# Patient Record
Sex: FEMALE | Race: WHITE | NOT HISPANIC OR LATINO | Employment: STUDENT | URBAN - METROPOLITAN AREA
[De-identification: names, ages, dates, MRNs, and addresses within clinical notes are randomized per-mention and may not be internally consistent; named-entity substitution may affect disease eponyms.]

---

## 2020-05-22 ENCOUNTER — OFFICE VISIT (OUTPATIENT)
Dept: GASTROENTEROLOGY | Facility: CLINIC | Age: 20
End: 2020-05-22

## 2020-05-22 VITALS — BODY MASS INDEX: 22.18 KG/M2 | HEIGHT: 66 IN | TEMPERATURE: 98 F | WEIGHT: 138 LBS

## 2020-05-22 DIAGNOSIS — K21.9 GASTROESOPHAGEAL REFLUX DISEASE, ESOPHAGITIS PRESENCE NOT SPECIFIED: ICD-10-CM

## 2020-05-22 DIAGNOSIS — R10.13 EPIGASTRIC PAIN: Primary | ICD-10-CM

## 2020-05-22 PROCEDURE — 99203 OFFICE O/P NEW LOW 30 MIN: CPT | Performed by: INTERNAL MEDICINE

## 2020-05-22 RX ORDER — LORATADINE 10 MG/1
1 CAPSULE, LIQUID FILLED ORAL DAILY
COMMUNITY
End: 2020-08-05 | Stop reason: ALTCHOICE

## 2020-05-22 RX ORDER — RABEPRAZOLE SODIUM 20 MG/1
20 TABLET, DELAYED RELEASE ORAL EVERY MORNING
Qty: 30 TABLET | Refills: 3 | Status: SHIPPED | OUTPATIENT
Start: 2020-05-22 | End: 2020-07-05 | Stop reason: SDUPTHER

## 2020-05-22 RX ORDER — RABEPRAZOLE SODIUM 20 MG/1
20 TABLET, DELAYED RELEASE ORAL EVERY MORNING
COMMUNITY
Start: 2020-04-29 | End: 2020-05-22 | Stop reason: SDUPTHER

## 2020-05-22 NOTE — PROGRESS NOTES
Subjective   Chief Complaint   Patient presents with   • Heartburn   • belching   • Abdominal Pain     discomfort       Aurea Rush is a  19 y.o. female here for an initial patient visit for heartburn, belching and abdominal pain.    Pt reports longstanding acid reflux for years.  She took TUMS prn.  She had symptoms after she ate regardless of food choices. She describes the symptoms as burning chest pain - she also had dull abdominal pain at times.  She felt regurgitation when she lies down.  She burps frequently and regurgitates.  The current med she takes is helping - she only has breakthrough if she eats something spicy.  No trouble swallowing.  Weight stable - no blood in stool.  No NSAIDs.          Past Medical History:   Diagnosis Date   • GERD (gastroesophageal reflux disease)      History reviewed. No pertinent surgical history.    Current Outpatient Medications:   •  Loratadine (Claritin) 10 MG capsule, Take 1 tablet by mouth Daily., Disp: , Rfl:   •  Multiple Vitamins-Minerals (HAIR SKIN AND NAILS FORMULA PO), Take 1 tablet by mouth Daily., Disp: , Rfl:   •  RABEprazole (ACIPHEX) 20 MG EC tablet, Take 1 tablet by mouth Every Morning., Disp: 30 tablet, Rfl: 3  PRN Meds:.  No Known Allergies  Social History     Socioeconomic History   • Marital status: Single     Spouse name: Not on file   • Number of children: Not on file   • Years of education: Not on file   • Highest education level: Not on file   Tobacco Use   • Smoking status: Never Smoker   • Smokeless tobacco: Never Used   Substance and Sexual Activity   • Alcohol use: Never     Frequency: Never   • Drug use: Never     History reviewed. No pertinent family history.  Review of Systems  Vitals:    05/22/20 1041   Temp: 98 °F (36.7 °C)         05/22/20  1041   Weight: 62.6 kg (138 lb)       Objective   Physical Exam   Constitutional: She appears well-developed and well-nourished.   HENT:   Head: Normocephalic and atraumatic.   Eyes: No scleral  icterus.   Pulmonary/Chest: Effort normal. No respiratory distress.   Abdominal: Soft. She exhibits no distension.   Neurological: She is alert.   Skin: Skin is warm and dry.   Psychiatric: She has a normal mood and affect.     No radiology results for the last 7 days    Assessment/Plan   Diagnoses and all orders for this visit:    Epigastric pain  -     Helicobacter Pylori, IgA IgG IgM  -     FL upper gi single contrast w kub; Future    Gastroesophageal reflux disease, esophagitis presence not specified  -     FL upper gi single contrast w kub; Future    Other orders  -     Discontinue: RABEprazole (ACIPHEX) 20 MG EC tablet; Take 20 mg by mouth Every Morning.  -     Multiple Vitamins-Minerals (HAIR SKIN AND NAILS FORMULA PO); Take 1 tablet by mouth Daily.  -     Loratadine (Claritin) 10 MG capsule; Take 1 tablet by mouth Daily.  -     RABEprazole (ACIPHEX) 20 MG EC tablet; Take 1 tablet by mouth Every Morning.      Plan:  · Symptoms are now controlled on AcipHex-continue same, Pepcid for breakthrough  · Check upper GI study for anatomic evaluation-assessment for esophagitis, other causes of abdominal and epigastric pain  · Check H. pylori antibody panel today

## 2020-05-26 ENCOUNTER — TELEPHONE (OUTPATIENT)
Dept: GASTROENTEROLOGY | Facility: CLINIC | Age: 20
End: 2020-05-26

## 2020-05-26 LAB
H PYLORI IGA SER-ACNC: <9 UNITS (ref 0–8.9)
H PYLORI IGG SER IA-ACNC: 0.17 INDEX VALUE (ref 0–0.79)
H PYLORI IGM SER-ACNC: <9 UNITS (ref 0–8.9)

## 2020-05-28 ENCOUNTER — TRANSCRIBE ORDERS (OUTPATIENT)
Dept: SLEEP MEDICINE | Facility: HOSPITAL | Age: 20
End: 2020-05-28

## 2020-05-28 DIAGNOSIS — Z01.818 OTHER SPECIFIED PRE-OPERATIVE EXAMINATION: Primary | ICD-10-CM

## 2020-05-29 ENCOUNTER — APPOINTMENT (OUTPATIENT)
Dept: LAB | Facility: HOSPITAL | Age: 20
End: 2020-05-29

## 2020-05-30 ENCOUNTER — LAB (OUTPATIENT)
Dept: LAB | Facility: HOSPITAL | Age: 20
End: 2020-05-30

## 2020-05-30 DIAGNOSIS — Z01.818 OTHER SPECIFIED PRE-OPERATIVE EXAMINATION: ICD-10-CM

## 2020-05-30 PROCEDURE — U0004 COV-19 TEST NON-CDC HGH THRU: HCPCS

## 2020-06-01 LAB
REF LAB TEST METHOD: NORMAL
SARS-COV-2 RNA RESP QL NAA+PROBE: NOT DETECTED

## 2020-06-02 ENCOUNTER — APPOINTMENT (OUTPATIENT)
Dept: GENERAL RADIOLOGY | Facility: HOSPITAL | Age: 20
End: 2020-06-02

## 2020-06-08 ENCOUNTER — TRANSCRIBE ORDERS (OUTPATIENT)
Dept: SLEEP MEDICINE | Facility: HOSPITAL | Age: 20
End: 2020-06-08

## 2020-06-08 DIAGNOSIS — Z01.818 OTHER SPECIFIED PRE-OPERATIVE EXAMINATION: Primary | ICD-10-CM

## 2020-06-12 ENCOUNTER — LAB (OUTPATIENT)
Dept: LAB | Facility: HOSPITAL | Age: 20
End: 2020-06-12

## 2020-06-12 DIAGNOSIS — Z01.818 OTHER SPECIFIED PRE-OPERATIVE EXAMINATION: ICD-10-CM

## 2020-06-12 PROCEDURE — U0004 COV-19 TEST NON-CDC HGH THRU: HCPCS

## 2020-06-13 LAB
REF LAB TEST METHOD: NORMAL
SARS-COV-2 RNA RESP QL NAA+PROBE: NOT DETECTED

## 2020-06-15 ENCOUNTER — TELEPHONE (OUTPATIENT)
Dept: GASTROENTEROLOGY | Facility: CLINIC | Age: 20
End: 2020-06-15

## 2020-06-15 ENCOUNTER — HOSPITAL ENCOUNTER (OUTPATIENT)
Dept: GENERAL RADIOLOGY | Facility: HOSPITAL | Age: 20
Discharge: HOME | End: 2020-06-15
Admitting: INTERNAL MEDICINE

## 2020-06-15 DIAGNOSIS — R10.13 EPIGASTRIC PAIN: ICD-10-CM

## 2020-06-15 DIAGNOSIS — K21.9 GASTROESOPHAGEAL REFLUX DISEASE, ESOPHAGITIS PRESENCE NOT SPECIFIED: ICD-10-CM

## 2020-06-15 PROCEDURE — 74246 X-RAY XM UPR GI TRC 2CNTRST: CPT

## 2020-06-15 RX ADMIN — ANTACID/ANTIFLATULENT 1 TABLET: 380; 550; 10; 10 GRANULE, EFFERVESCENT ORAL at 08:42

## 2020-06-15 RX ADMIN — BARIUM SULFATE 135 ML: 980 POWDER, FOR SUSPENSION ORAL at 08:42

## 2020-06-15 NOTE — TELEPHONE ENCOUNTER
Upper GI study shows no hiatal hernia.  Trace amount of gastroesophageal reflux is noted.  Otherwise study was normal.  Continue current regimen, continue diet modification and lifestyle modification to decrease acid reflux

## 2020-07-01 ENCOUNTER — TELEPHONE (OUTPATIENT)
Dept: GASTROENTEROLOGY | Facility: CLINIC | Age: 20
End: 2020-07-01

## 2020-07-02 NOTE — TELEPHONE ENCOUNTER
Call from pt.  Advise per Dr Prakash note.  Verb understanding.     Has been taking aciphex 20 mg QAM - helps in the morning and afternoon, but by dinner time till bedtime has worsening reflux.  Difficult to fall asleep 2nd symptoms.  Wakens in AM with reflux.  Asking if alternative rx.      Asking what could be causing reflux.      Update/questions to Dr Prakash

## 2020-07-05 RX ORDER — RABEPRAZOLE SODIUM 20 MG/1
20 TABLET, DELAYED RELEASE ORAL
Qty: 60 TABLET | Refills: 3 | Status: SHIPPED | OUTPATIENT
Start: 2020-07-05 | End: 2020-12-09 | Stop reason: SDUPTHER

## 2020-07-17 ENCOUNTER — PRIOR AUTHORIZATION (OUTPATIENT)
Dept: GASTROENTEROLOGY | Facility: CLINIC | Age: 20
End: 2020-07-17

## 2020-08-05 ENCOUNTER — OFFICE VISIT (OUTPATIENT)
Dept: GASTROENTEROLOGY | Facility: CLINIC | Age: 20
End: 2020-08-05

## 2020-08-05 VITALS — HEIGHT: 66 IN | TEMPERATURE: 97.5 F | WEIGHT: 140.6 LBS | BODY MASS INDEX: 22.6 KG/M2

## 2020-08-05 DIAGNOSIS — K21.9 GASTROESOPHAGEAL REFLUX DISEASE WITHOUT ESOPHAGITIS: Primary | ICD-10-CM

## 2020-08-05 PROCEDURE — 99213 OFFICE O/P EST LOW 20 MIN: CPT | Performed by: INTERNAL MEDICINE

## 2020-08-05 NOTE — PROGRESS NOTES
Subjective   Chief Complaint   Patient presents with   • Follow-up   • Heartburn   • Abdominal Pain       Aurea Rush is a  19 y.o. female here for a follow up visit for heartburn and abdominal pain.     She was last seen in the office in May of this year.  She had previous issues which had improved after resuming AcipHex.  Since her last visit she is had an upper GI study.  Upper GI showed trace GERD, no hiatal hernia or other anatomic abnormality.  Had a celiac panel checked which was normal.    She feels much better since increasing aciphex to bid.  Overall feeling much better.  Still with some belching.  Nighttime symptoms have been poorly controlled unless she takes medication.      HPI  Past Medical History:   Diagnosis Date   • GERD (gastroesophageal reflux disease)    • Hashimoto's disease 2020     History reviewed. No pertinent surgical history.    Current Outpatient Medications:   •  Etonogestrel (NEXPLANON) 68 MG implant subdermal implant, Inject 1 each into the appropriate area of the skin as directed by provider 1 (One) Time., Disp: , Rfl:   •  Multiple Vitamins-Minerals (HAIR SKIN AND NAILS FORMULA PO), Take 1 tablet by mouth Daily., Disp: , Rfl:   •  RABEprazole (ACIPHEX) 20 MG EC tablet, Take 1 tablet by mouth 2 (Two) Times a Day Before Meals., Disp: 60 tablet, Rfl: 3  PRN Meds:.  Allergies   Allergen Reactions   • Amoxicillin Other (See Comments)     Not sure if its her or twin brother per parents     Social History     Socioeconomic History   • Marital status: Single     Spouse name: Not on file   • Number of children: Not on file   • Years of education: Not on file   • Highest education level: Not on file   Tobacco Use   • Smoking status: Never Smoker   • Smokeless tobacco: Never Used   Substance and Sexual Activity   • Alcohol use: Never     Frequency: Never   • Drug use: Never     History reviewed. No pertinent family history.  Review of Systems   Constitutional: Negative for appetite change  and unexpected weight change.   HENT: Negative for trouble swallowing.    Gastrointestinal: Negative for abdominal pain, nausea and vomiting.   All other systems reviewed and are negative.    Vitals:    08/05/20 1519   Temp: 97.5 °F (36.4 °C)         08/05/20  1519   Weight: 63.8 kg (140 lb 9.6 oz)       Objective   Physical Exam   Constitutional: She appears well-developed and well-nourished.   HENT:   Head: Normocephalic and atraumatic.   Eyes: No scleral icterus.   Pulmonary/Chest: Effort normal. No respiratory distress.   Abdominal: Soft. She exhibits no distension.   Neurological: She is alert.   Skin: Skin is warm and dry.   Psychiatric: She has a normal mood and affect.     No radiology results for the last 7 days    Assessment/Plan   Diagnoses and all orders for this visit:    Gastroesophageal reflux disease without esophagitis    Other orders  -     Etonogestrel (NEXPLANON) 68 MG implant subdermal implant; Inject 1 each into the appropriate area of the skin as directed by provider 1 (One) Time.      Plan:  · Fairly well controlled with current regimen-continue same.  · Stressed the importance of diet and lifestyle modification to keep symptoms at [place]  · We will have her follow-up over her holiday break and hopefully can decrease her medication at that time

## 2020-10-01 ENCOUNTER — TELEPHONE (OUTPATIENT)
Dept: GASTROENTEROLOGY | Facility: CLINIC | Age: 20
End: 2020-10-01

## 2020-10-01 NOTE — TELEPHONE ENCOUNTER
----- Message from Deshaun Vila sent at 10/1/2020  2:21 PM EDT -----  Regarding: PT HAVING MUSCLE TWITCHES FROM RABEprazole (ACIPHEX)  PT SAID SHE IS HAVING MUSCLE TWITCHING, THINKS IT A SIDE EFFECT OF MEDICINE. WOULD LIKE NURSE TO CALL HER. [phone]        RABEprazole (ACIPHEX) 20 MG EC tablet 60 tablet 3 7/5/2020    Sig - Route: Take 1 tablet by mouth 2 (Two) Times a Day Before Meals. - Oral   Sent to pharmacy as: RABEprazole Sodium 20 MG Oral Tablet Delayed Release (ACIPHEX)   E-Prescribing Status: Receipt confirmed by pharmacy (7/5/2020  2:25 PM EDT)

## 2020-10-01 NOTE — TELEPHONE ENCOUNTER
Call to pt.  States for past 4-6 wks, has noted sporadic muscle twitching in L upper arm.  Initially occurred only at HS - now occurs throughout the day.      Aciphex has helped acid reflux.  Still has episodes before bed, or first this in morning.  Notes that greasy foods, or large meals exacerbate reflux.      Concerned that muscle twitching from aciphex.     Update to DR Prakash.

## 2020-10-02 NOTE — TELEPHONE ENCOUNTER
Not a known side effect of this drug - can hold it for a few days and see if it improves.    Continue to modify diet to improve symptoms - large meals, greasy food are common reflux culprits

## 2020-12-09 ENCOUNTER — OFFICE VISIT (OUTPATIENT)
Dept: GASTROENTEROLOGY | Facility: CLINIC | Age: 20
End: 2020-12-09

## 2020-12-09 VITALS — WEIGHT: 140 LBS | HEIGHT: 66 IN | BODY MASS INDEX: 22.5 KG/M2

## 2020-12-09 DIAGNOSIS — K21.9 GASTROESOPHAGEAL REFLUX DISEASE WITHOUT ESOPHAGITIS: Primary | ICD-10-CM

## 2020-12-09 PROCEDURE — 99442 PR PHYS/QHP TELEPHONE EVALUATION 11-20 MIN: CPT | Performed by: INTERNAL MEDICINE

## 2020-12-09 RX ORDER — RABEPRAZOLE SODIUM 20 MG/1
20 TABLET, DELAYED RELEASE ORAL DAILY
Qty: 90 TABLET | Refills: 3 | Status: SHIPPED | OUTPATIENT
Start: 2020-12-09 | End: 2021-01-05

## 2020-12-09 NOTE — PROGRESS NOTES
Patient has consented to proceed with a telehealth (telephone or telemedicine) visit in lieu of an office visit given the coronavirus epidemic.    This encounter was provided via real-time audio/video technology.    Subjective     History of present illness:    20 y.o. female for f/u of heartburn and abdominal pain.  Was last seen in August 2020.    She was last seen in the office in May of this year.  She had previous issues which had improved after resuming AcipHex.  Since her last visit she is had an upper GI study.  Upper GI showed trace GERD, no hiatal hernia or other anatomic abnormality.  Had a celiac panel checked which was normal.    Since her last visit she is decreased her AcipHex to once daily.  She is done fairly well with this.  She occasionally has some nighttime symptoms and is usually if she eats too much or if she eats certain types of food.  She has on occasion taken a second AcipHex.  She has no trouble swallowing.  No abdominal pain.  She has difficulty with carbonated beverages and caffeine.  Eating too much often causes indigestion.      Past Medical History:  Past Medical History:   Diagnosis Date   • GERD (gastroesophageal reflux disease)    • Hashimoto's disease 2020     Past Surgical History:  Past Surgical History:   Procedure Laterality Date   • WISDOM TOOTH EXTRACTION  2017      Social History:   Social History     Tobacco Use   • Smoking status: Never Smoker   • Smokeless tobacco: Never Used   Substance Use Topics   • Alcohol use: Yes     Frequency: Never     Comment: weekend      Family History:  No family history on file.    Home Meds:    Prior to Admission medications    Medication Sig Start Date End Date Taking? Authorizing Provider   Etonogestrel (NEXPLANON) 68 MG implant subdermal implant Inject 1 each into the appropriate area of the skin as directed by provider 1 (One) Time.   Yes ProviderBrian MD   Multiple Vitamins-Minerals (HAIR SKIN AND NAILS FORMULA PO) Take 1  tablet by mouth Daily.   Yes Provider, Brian, MD   Pseudoephedrine-Ibuprofen ([facility] COLD & SINUS RELIEF PO) Take  by mouth.   Yes Provider, MD Brian   RABEprazole (ACIPHEX) 20 MG EC tablet Take 1 tablet by mouth 2 (Two) Times a Day Before Meals. 7/5/20  Yes Carole Prakash MD     Allergies:  Allergies   Allergen Reactions   • Amoxicillin Other (See Comments)     Not sure if its her or twin brother per parents     Review of Systems  Pertinent items are noted in HPI, all other systems reviewed and negative     Objective         Assessment/Plan   There is no problem list on file for this patient.      Assessment:  1. GERD    Plan:  · Fairly well controlled on daily AcipHex-we will continue current regimen.  Have counseled patient regarding diet lifestyle modification to reduce symptoms.  Can use Pepcid as needed for breakthrough    Time of call was 11 minutes      Carole Prakash MD

## 2021-01-05 RX ORDER — RABEPRAZOLE SODIUM 20 MG/1
TABLET, DELAYED RELEASE ORAL
Qty: 60 TABLET | Refills: 11 | Status: SHIPPED | OUTPATIENT
Start: 2021-01-05

## 2021-01-08 ENCOUNTER — PRIOR AUTHORIZATION (OUTPATIENT)
Dept: GASTROENTEROLOGY | Facility: CLINIC | Age: 21
End: 2021-01-08

## 2021-06-14 ENCOUNTER — OFFICE (OUTPATIENT)
Dept: URBAN - METROPOLITAN AREA CLINIC 75 | Facility: CLINIC | Age: 21
End: 2021-06-14

## 2021-06-14 VITALS
OXYGEN SATURATION: 99 % | HEIGHT: 66 IN | SYSTOLIC BLOOD PRESSURE: 112 MMHG | DIASTOLIC BLOOD PRESSURE: 78 MMHG | WEIGHT: 144 LBS | HEART RATE: 78 BPM | RESPIRATION RATE: 12 BRPM

## 2021-06-14 DIAGNOSIS — K21.9 GASTRO-ESOPHAGEAL REFLUX DISEASE WITHOUT ESOPHAGITIS: ICD-10-CM

## 2021-06-14 PROCEDURE — 99204 OFFICE O/P NEW MOD 45 MIN: CPT | Performed by: INTERNAL MEDICINE

## 2021-06-14 RX ORDER — PANTOPRAZOLE SODIUM 40 MG/1
TABLET, DELAYED RELEASE ORAL
Qty: 90 | Refills: 3 | Status: COMPLETED
Start: 2021-06-14 | End: 2022-08-19

## 2021-08-16 VITALS
SYSTOLIC BLOOD PRESSURE: 121 MMHG | HEART RATE: 70 BPM | HEIGHT: 66 IN | DIASTOLIC BLOOD PRESSURE: 89 MMHG | HEART RATE: 66 BPM | DIASTOLIC BLOOD PRESSURE: 62 MMHG | RESPIRATION RATE: 13 BRPM | HEART RATE: 67 BPM | SYSTOLIC BLOOD PRESSURE: 120 MMHG | RESPIRATION RATE: 11 BRPM | TEMPERATURE: 98.3 F | SYSTOLIC BLOOD PRESSURE: 148 MMHG | SYSTOLIC BLOOD PRESSURE: 115 MMHG | SYSTOLIC BLOOD PRESSURE: 117 MMHG | DIASTOLIC BLOOD PRESSURE: 58 MMHG | SYSTOLIC BLOOD PRESSURE: 122 MMHG | TEMPERATURE: 98.1 F | RESPIRATION RATE: 12 BRPM | RESPIRATION RATE: 18 BRPM | SYSTOLIC BLOOD PRESSURE: 112 MMHG | HEART RATE: 76 BPM | RESPIRATION RATE: 17 BRPM | DIASTOLIC BLOOD PRESSURE: 66 MMHG | RESPIRATION RATE: 16 BRPM | HEART RATE: 62 BPM | OXYGEN SATURATION: 99 % | DIASTOLIC BLOOD PRESSURE: 60 MMHG | DIASTOLIC BLOOD PRESSURE: 51 MMHG | DIASTOLIC BLOOD PRESSURE: 70 MMHG | HEART RATE: 79 BPM | OXYGEN SATURATION: 100 % | WEIGHT: 144 LBS

## 2021-08-18 ENCOUNTER — AMBULATORY SURGICAL CENTER (OUTPATIENT)
Dept: URBAN - METROPOLITAN AREA SURGERY 17 | Facility: SURGERY | Age: 21
End: 2021-08-18

## 2021-08-18 DIAGNOSIS — K29.50 UNSPECIFIED CHRONIC GASTRITIS WITHOUT BLEEDING: ICD-10-CM

## 2021-08-18 DIAGNOSIS — K21.00 GASTRO-ESOPHAGEAL REFLUX DISEASE WITH ESOPHAGITIS, WITHOUT B: ICD-10-CM

## 2021-08-18 DIAGNOSIS — K44.9 DIAPHRAGMATIC HERNIA WITHOUT OBSTRUCTION OR GANGRENE: ICD-10-CM

## 2021-08-18 PROBLEM — K31.89 OTHER DISEASES OF STOMACH AND DUODENUM: Status: ACTIVE | Noted: 2021-08-18

## 2021-08-18 LAB
GI HISTOLOGY: A. UNSPECIFIED: (no result)
GI HISTOLOGY: B. SELECT: (no result)
GI HISTOLOGY: C. UNSPECIFIED: (no result)
GI HISTOLOGY: D. UNSPECIFIED: (no result)
GI HISTOLOGY: PDF REPORT: (no result)

## 2021-08-18 PROCEDURE — 43239 EGD BIOPSY SINGLE/MULTIPLE: CPT | Performed by: INTERNAL MEDICINE

## 2022-02-18 ENCOUNTER — OFFICE (OUTPATIENT)
Dept: URBAN - METROPOLITAN AREA CLINIC 75 | Facility: CLINIC | Age: 22
End: 2022-02-18

## 2022-02-18 VITALS
SYSTOLIC BLOOD PRESSURE: 108 MMHG | HEIGHT: 66 IN | DIASTOLIC BLOOD PRESSURE: 78 MMHG | OXYGEN SATURATION: 98 % | HEART RATE: 98 BPM | WEIGHT: 158.2 LBS

## 2022-02-18 DIAGNOSIS — K21.9 GASTRO-ESOPHAGEAL REFLUX DISEASE WITHOUT ESOPHAGITIS: ICD-10-CM

## 2022-02-18 DIAGNOSIS — K44.9 DIAPHRAGMATIC HERNIA WITHOUT OBSTRUCTION OR GANGRENE: ICD-10-CM

## 2022-02-18 PROCEDURE — 99214 OFFICE O/P EST MOD 30 MIN: CPT | Performed by: NURSE PRACTITIONER

## 2022-02-18 RX ORDER — PANTOPRAZOLE SODIUM 40 MG/1
TABLET, DELAYED RELEASE ORAL
Qty: 90 | Refills: 3 | Status: COMPLETED
Start: 2021-06-14 | End: 2022-08-19

## 2022-08-19 ENCOUNTER — OFFICE (OUTPATIENT)
Dept: URBAN - METROPOLITAN AREA CLINIC 75 | Facility: CLINIC | Age: 22
End: 2022-08-19

## 2022-08-19 VITALS — HEIGHT: 66 IN | SYSTOLIC BLOOD PRESSURE: 130 MMHG | WEIGHT: 157.4 LBS | DIASTOLIC BLOOD PRESSURE: 85 MMHG

## 2022-08-19 DIAGNOSIS — K21.9 GASTRO-ESOPHAGEAL REFLUX DISEASE WITHOUT ESOPHAGITIS: ICD-10-CM

## 2022-08-19 DIAGNOSIS — K44.9 DIAPHRAGMATIC HERNIA WITHOUT OBSTRUCTION OR GANGRENE: ICD-10-CM

## 2022-08-19 PROCEDURE — 99213 OFFICE O/P EST LOW 20 MIN: CPT | Performed by: NURSE PRACTITIONER

## 2022-08-19 RX ORDER — PANTOPRAZOLE SODIUM 40 MG/1
TABLET, DELAYED RELEASE ORAL
Qty: 90 | Refills: 3 | Status: COMPLETED
Start: 2021-06-14 | End: 2022-08-19

## 2022-08-19 RX ORDER — PANTOPRAZOLE 20 MG/1
20 TABLET, DELAYED RELEASE ORAL
Qty: 90 | Refills: 0 | Status: COMPLETED
Start: 2022-08-19 | End: 2023-12-01

## 2022-11-21 ENCOUNTER — OFFICE (OUTPATIENT)
Dept: URBAN - METROPOLITAN AREA CLINIC 76 | Facility: CLINIC | Age: 22
End: 2022-11-21

## 2022-11-21 VITALS
WEIGHT: 164 LBS | OXYGEN SATURATION: 98 % | DIASTOLIC BLOOD PRESSURE: 80 MMHG | HEIGHT: 66 IN | SYSTOLIC BLOOD PRESSURE: 120 MMHG | HEART RATE: 82 BPM

## 2022-11-21 DIAGNOSIS — K44.9 DIAPHRAGMATIC HERNIA WITHOUT OBSTRUCTION OR GANGRENE: ICD-10-CM

## 2022-11-21 DIAGNOSIS — K21.00 GASTRO-ESOPHAGEAL REFLUX DISEASE WITH ESOPHAGITIS, WITHOUT B: ICD-10-CM

## 2022-11-21 PROCEDURE — 99213 OFFICE O/P EST LOW 20 MIN: CPT | Performed by: NURSE PRACTITIONER

## 2022-11-21 RX ORDER — PANTOPRAZOLE 20 MG/1
20 TABLET, DELAYED RELEASE ORAL
Qty: 90 | Refills: 0 | Status: COMPLETED
Start: 2022-08-19 | End: 2023-12-01

## 2023-12-01 ENCOUNTER — OFFICE (OUTPATIENT)
Dept: URBAN - METROPOLITAN AREA CLINIC 76 | Facility: CLINIC | Age: 23
End: 2023-12-01

## 2023-12-01 VITALS
HEART RATE: 67 BPM | DIASTOLIC BLOOD PRESSURE: 83 MMHG | WEIGHT: 177 LBS | OXYGEN SATURATION: 97 % | SYSTOLIC BLOOD PRESSURE: 131 MMHG | HEIGHT: 66 IN

## 2023-12-01 DIAGNOSIS — K21.9 GASTRO-ESOPHAGEAL REFLUX DISEASE WITHOUT ESOPHAGITIS: ICD-10-CM

## 2023-12-01 DIAGNOSIS — K44.9 DIAPHRAGMATIC HERNIA WITHOUT OBSTRUCTION OR GANGRENE: ICD-10-CM

## 2023-12-01 PROCEDURE — 99213 OFFICE O/P EST LOW 20 MIN: CPT | Performed by: NURSE PRACTITIONER

## 2023-12-01 RX ORDER — PANTOPRAZOLE 20 MG/1
20 TABLET, DELAYED RELEASE ORAL
Qty: 90 | Refills: 0 | Status: COMPLETED
Start: 2022-08-19 | End: 2023-12-01

## 2023-12-01 RX ORDER — OMEPRAZOLE 20 MG/1
CAPSULE, DELAYED RELEASE ORAL
Qty: 90 | Refills: 3 | Status: COMPLETED
Start: 2023-12-01 | End: 2024-02-29

## 2024-12-30 ENCOUNTER — OFFICE (OUTPATIENT)
Dept: URBAN - METROPOLITAN AREA CLINIC 76 | Facility: CLINIC | Age: 24
End: 2024-12-30
Payer: COMMERCIAL

## 2024-12-30 VITALS
OXYGEN SATURATION: 97 % | WEIGHT: 167 LBS | HEART RATE: 78 BPM | HEIGHT: 66 IN | DIASTOLIC BLOOD PRESSURE: 70 MMHG | SYSTOLIC BLOOD PRESSURE: 120 MMHG

## 2024-12-30 DIAGNOSIS — K21.9 GASTRO-ESOPHAGEAL REFLUX DISEASE WITHOUT ESOPHAGITIS: ICD-10-CM

## 2024-12-30 PROCEDURE — 99214 OFFICE O/P EST MOD 30 MIN: CPT

## 2024-12-30 RX ORDER — FAMOTIDINE 20 MG/1
20 TABLET, FILM COATED ORAL
Qty: 90 | Refills: 3 | Status: ACTIVE
Start: 2024-12-30